# Patient Record
(demographics unavailable — no encounter records)

---

## 2024-12-09 NOTE — REVIEW OF SYSTEMS
[Fever] : no fever [Chills] : no chills [Pain] : no pain [Earache] : no earache [Hearing Loss] : hearing loss [Chest Pain] : no chest pain [Palpitations] : no palpitations [Orthopnea] : no orthopnea [Shortness Of Breath] : no shortness of breath [Wheezing] : no wheezing [Cough] : no cough [Abdominal Pain] : no abdominal pain [Nausea] : no nausea [Vomiting] : no vomiting [Dysuria] : no dysuria [Hematuria] : no hematuria [Joint Pain] : joint pain [Joint Stiffness] : joint stiffness [Itching] : no itching [Headache] : no headache [Dizziness] : no dizziness

## 2024-12-09 NOTE — ASSESSMENT
[FreeTextEntry1] : An 89 years old female with no past medical history. presented to establish care.  # Recurrent dizziness # Left wrist pain  # osteoarthritis  - left xray with no acute fracture, chronic changes  - PT, OT  - Previous MRI brain and TTE were unremarkable  -  help for aid arrangement   # Mild leukopenia  - Lab work done outside, WBC 2.7  - Follow up   # HCP  - Last colonoscopy 2022, polyps removed. No need to repeat unless symptomatic  - last blood work, last month, unremarkable. Will repeat in 6 months before next visit  -  to evaluate for home aid

## 2024-12-09 NOTE — PHYSICAL EXAM
[No Acute Distress] : no acute distress [Well Nourished] : well nourished [Well Developed] : well developed [Normal Sclera/Conjunctiva] : normal sclera/conjunctiva [Normal Outer Ear/Nose] : the outer ears and nose were normal in appearance [No Respiratory Distress] : no respiratory distress  [No Accessory Muscle Use] : no accessory muscle use [Normal Rate] : normal rate  [Regular Rhythm] : with a regular rhythm [Normal S1, S2] : normal S1 and S2 [No Edema] : there was no peripheral edema [Soft] : abdomen soft [Non Tender] : non-tender [Non-distended] : non-distended [No CVA Tenderness] : no CVA  tenderness [No Joint Swelling] : no joint swelling [Grossly Normal Strength/Tone] : grossly normal strength/tone [No Rash] : no rash [Coordination Grossly Intact] : coordination grossly intact [No Focal Deficits] : no focal deficits

## 2024-12-09 NOTE — HISTORY OF PRESENT ILLNESS
[FreeTextEntry1] : Establish care [de-identified] : an 89 year old female, came to establish care. She is complaining from recurrent dizziness and falls, hurting her left wrist and left leg. Still feeling pain in the left wrist limiting her daily activities. She denied any past medical history, no heart or lung disease.  She is requesting OT, PT, and aid to help her.   According to the chart she has, blood work last November was all within acceptable range. She had imaging of the heart and brain few years ago that was unremarkable.

## 2025-04-07 NOTE — ASSESSMENT
[FreeTextEntry1] : 89 year old female with no significant past medical history, presents after a fall. Pt climbed on to her bathtub to hang clothes on her shower curtain pole and slipped and hit the lt side of her head.  Pt endorses a mild headache and mild tenderness to the lt side of her head. Pt completed 2 months of PT but did not go to OT. Denies fever, chills, body aches, chest pain, palpitation.   # s/p mechanical fall  # recurrent dizziness # left wrist pain  # osteoarthritis  - s/p 2 months of PT  - multiple MRI brain and TTE were unremarkable  -  help for aid arrangement at home multiple times a day   # Mild leukopenia  - Lab work done outside, WBC 2.7  - Follow up   # HCP  - Last colonoscopy 2022, polyps removed. No need to repeat unless symptomatic  - last blood work, last month, unremarkable. Will repeat in 6 months before next visit  -  to evaluate for home aid  - Labs: CBC, CMP. lipids, A1C

## 2025-04-07 NOTE — REVIEW OF SYSTEMS
[Hearing Loss] : hearing loss [Joint Pain] : joint pain [Joint Stiffness] : joint stiffness [Dizziness] : dizziness [Fever] : no fever [Chills] : no chills [Pain] : no pain [Earache] : no earache [Chest Pain] : no chest pain [Palpitations] : no palpitations [Orthopnea] : no orthopnea [Shortness Of Breath] : no shortness of breath [Wheezing] : no wheezing [Cough] : no cough [Abdominal Pain] : no abdominal pain [Nausea] : no nausea [Vomiting] : no vomiting [Dysuria] : no dysuria [Hematuria] : no hematuria [Itching] : no itching [Headache] : no headache [Fainting] : no fainting [Confusion] : no confusion [Memory Loss] : no memory loss [FreeTextEntry9] : Lt hip pian

## 2025-04-07 NOTE — HISTORY OF PRESENT ILLNESS
[de-identified] : 89 year old female with no significant past medical history, presents after a fall. Pt climbed on to her bathtub to hang clothes on her shower curtain pole and slipped and hit the lt side of her head.  Pt endorses a mild headache and mild tenderness to the lt side of her head. Pt completed 2 months of PT but did not go to OT. Denies fever, chills, body aches, chest pain, palpitation.